# Patient Record
Sex: FEMALE | Race: WHITE | NOT HISPANIC OR LATINO | Employment: OTHER | URBAN - METROPOLITAN AREA
[De-identification: names, ages, dates, MRNs, and addresses within clinical notes are randomized per-mention and may not be internally consistent; named-entity substitution may affect disease eponyms.]

---

## 2017-10-23 ENCOUNTER — TRANSCRIBE ORDERS (OUTPATIENT)
Dept: ADMINISTRATIVE | Facility: HOSPITAL | Age: 72
End: 2017-10-23

## 2017-10-23 DIAGNOSIS — Z12.31 VISIT FOR SCREENING MAMMOGRAM: Primary | ICD-10-CM

## 2017-12-14 ENCOUNTER — HOSPITAL ENCOUNTER (OUTPATIENT)
Dept: MAMMOGRAPHY | Facility: HOSPITAL | Age: 72
Discharge: HOME | End: 2017-12-14
Attending: INTERNAL MEDICINE | Admitting: INTERNAL MEDICINE

## 2017-12-14 DIAGNOSIS — Z12.31 VISIT FOR SCREENING MAMMOGRAM: ICD-10-CM

## 2017-12-14 PROCEDURE — 77063 BREAST TOMOSYNTHESIS BI: CPT

## 2017-12-14 PROCEDURE — G0202 SCR MAMMO BI INCL CAD: HCPCS | Performed by: RADIOLOGY

## 2017-12-14 PROCEDURE — G0202 SCR MAMMO BI INCL CAD: HCPCS

## 2017-12-14 PROCEDURE — 77063 BREAST TOMOSYNTHESIS BI: CPT | Performed by: RADIOLOGY

## 2018-03-04 ENCOUNTER — APPOINTMENT (OUTPATIENT)
Dept: CT IMAGING | Facility: HOSPITAL | Age: 73
End: 2018-03-04

## 2018-03-04 ENCOUNTER — HOSPITAL ENCOUNTER (EMERGENCY)
Facility: HOSPITAL | Age: 73
Discharge: HOME | End: 2018-03-04
Attending: EMERGENCY MEDICINE | Admitting: EMERGENCY MEDICINE

## 2018-03-04 VITALS
SYSTOLIC BLOOD PRESSURE: 134 MMHG | OXYGEN SATURATION: 92 % | RESPIRATION RATE: 18 BRPM | TEMPERATURE: 98.3 F | BODY MASS INDEX: 30.66 KG/M2 | WEIGHT: 184 LBS | HEIGHT: 65 IN | HEART RATE: 77 BPM | DIASTOLIC BLOOD PRESSURE: 66 MMHG

## 2018-03-04 DIAGNOSIS — M54.12 CERVICAL RADICULOPATHY: Primary | ICD-10-CM

## 2018-03-04 DIAGNOSIS — M25.78 OSTEOPHYTE OF CERVICAL SPINE: ICD-10-CM

## 2018-03-04 PROCEDURE — 72125 CT NECK SPINE W/O DYE: CPT

## 2018-03-04 PROCEDURE — 63710000001 PREDNISONE PER 1 MG: Performed by: PHYSICIAN ASSISTANT

## 2018-03-04 PROCEDURE — 99283 EMERGENCY DEPT VISIT LOW MDM: CPT

## 2018-03-04 RX ORDER — ASCORBIC ACID 500 MG
500 TABLET ORAL DAILY
COMMUNITY

## 2018-03-04 RX ORDER — ALPRAZOLAM 0.25 MG/1
0.25 TABLET ORAL 2 TIMES DAILY PRN
COMMUNITY
End: 2021-09-17

## 2018-03-04 RX ORDER — LOSARTAN POTASSIUM 50 MG/1
50 TABLET ORAL DAILY
COMMUNITY
End: 2021-12-09

## 2018-03-04 RX ORDER — HYDROCODONE BITARTRATE AND ACETAMINOPHEN 5; 325 MG/1; MG/1
1 TABLET ORAL EVERY 6 HOURS PRN
Qty: 12 TABLET | Refills: 0 | OUTPATIENT
Start: 2018-03-04 | End: 2021-09-17

## 2018-03-04 RX ORDER — HYDROCODONE BITARTRATE AND ACETAMINOPHEN 10; 325 MG/1; MG/1
1 TABLET ORAL ONCE
Status: COMPLETED | OUTPATIENT
Start: 2018-03-04 | End: 2018-03-04

## 2018-03-04 RX ORDER — PREDNISONE 20 MG/1
60 TABLET ORAL ONCE
Status: COMPLETED | OUTPATIENT
Start: 2018-03-04 | End: 2018-03-04

## 2018-03-04 RX ORDER — CYCLOBENZAPRINE HCL 10 MG
10 TABLET ORAL NIGHTLY PRN
COMMUNITY

## 2018-03-04 RX ORDER — DULOXETIN HYDROCHLORIDE 60 MG/1
60 CAPSULE, DELAYED RELEASE ORAL DAILY
COMMUNITY

## 2018-03-04 RX ORDER — PREDNISONE 20 MG/1
TABLET ORAL
Qty: 18 TABLET | Refills: 0 | OUTPATIENT
Start: 2018-03-04 | End: 2021-09-17

## 2018-03-04 RX ORDER — ONDANSETRON 4 MG/1
4 TABLET, ORALLY DISINTEGRATING ORAL EVERY 8 HOURS PRN
Qty: 12 TABLET | Refills: 0 | OUTPATIENT
Start: 2018-03-04 | End: 2021-09-17

## 2018-03-04 RX ORDER — ASPIRIN 81 MG/1
81 TABLET ORAL DAILY
COMMUNITY

## 2018-03-04 RX ORDER — CHLORAL HYDRATE 500 MG
CAPSULE ORAL 2 TIMES DAILY WITH MEALS
COMMUNITY
End: 2021-09-17

## 2018-03-04 RX ORDER — ONDANSETRON 4 MG/1
4 TABLET, ORALLY DISINTEGRATING ORAL ONCE
Status: COMPLETED | OUTPATIENT
Start: 2018-03-04 | End: 2018-03-04

## 2018-03-04 RX ORDER — ROSUVASTATIN CALCIUM 20 MG/1
20 TABLET, COATED ORAL DAILY
COMMUNITY

## 2018-03-04 RX ORDER — MELATONIN
2000 DAILY
COMMUNITY

## 2018-03-04 RX ADMIN — HYDROCODONE BITARTRATE AND ACETAMINOPHEN 1 TABLET: 10; 325 TABLET ORAL at 10:05

## 2018-03-04 RX ADMIN — PREDNISONE 60 MG: 20 TABLET ORAL at 10:06

## 2018-03-04 RX ADMIN — ONDANSETRON 4 MG: 4 TABLET, ORALLY DISINTEGRATING ORAL at 10:06

## 2018-03-04 NOTE — ED PROVIDER NOTES
Subjective   Patient has had a slow progressive increasing pain in the left side of her neck.  This radiates down into her left arm.  She denies any weakness of the arm.  She's had some tingling sensation associated with this.  She's been using Flexeril which is her regular medication for her daily and this has not really helped at all.  Patient had no recent trauma no recent fall.  Patient reports that she has seen her family doctor in the setting or to physical therapy but she's not been started at this point.  No prior history of any spontaneous fractures.  She has a chest pain shortness of breath diaphoresis nausea or vomiting the pain is definitely reproducible.      Patient is a 73 y.o. female presenting with neck pain.   History provided by:  Patient and spouse   used: No    Neck Pain   Pain location:  L side  Quality:  Burning, aching and shooting  Pain radiates to:  L shoulder and L arm  Pain severity:  Severe  Pain is:  Same all the time  Onset quality:  Gradual  Duration:  2 weeks  Timing:  Constant  Progression:  Waxing and waning  Chronicity:  New  Context: not fall, not MCA and not MVC    Relieved by:  Nothing  Worsened by:  Position, sneezing, coughing, bending and twisting  Ineffective treatments:  Ice and muscle relaxants  Associated symptoms: numbness and tingling    Associated symptoms: no bladder incontinence, no bowel incontinence, no chest pain, no fever, no leg pain, no paresis, no photophobia, no syncope, no visual change, no weakness and no weight loss    Risk factors: no hx of head and neck radiation, no hx of osteoporosis, no hx of spinal trauma, no recent epidural, no recent head injury and no recurrent falls        Review of Systems   Constitutional: Negative for chills, fever and weight loss.   HENT: Negative for rhinorrhea and sore throat.    Eyes: Negative for photophobia.   Respiratory: Negative for chest tightness, shortness of breath and wheezing.     Cardiovascular: Negative for chest pain, palpitations, leg swelling and syncope.   Gastrointestinal: Negative for bowel incontinence.   Genitourinary: Negative for bladder incontinence, dysuria, hematuria and urgency.   Musculoskeletal: Positive for neck pain and neck stiffness. Negative for back pain.   Skin: Negative for pallor.   Neurological: Positive for tingling and numbness. Negative for weakness.   Psychiatric/Behavioral: Negative.    All other systems reviewed and are negative.      Past Medical History:   Diagnosis Date   • Arthritis    • Cancer     skin    • Fibromyalgia    • Hyperlipidemia    • Hypertension        Allergies   Allergen Reactions   • Lisinopril Cough       Past Surgical History:   Procedure Laterality Date   • BREAST BIOPSY Right 2011   • CYST REMOVAL     • DILATATION AND CURETTAGE     • KNEE SURGERY Bilateral    • MOUTH SURGERY         Family History   Problem Relation Age of Onset   • Breast cancer Mother 73   • Ovarian cancer Neg Hx        Social History     Social History   • Marital status:      Social History Main Topics   • Smoking status: Never Smoker   • Smokeless tobacco: Never Used   • Alcohol use No   • Drug use: No   • Sexual activity: Defer     Other Topics Concern   • Not on file           Objective   Physical Exam   Constitutional: She is oriented to person, place, and time. She appears well-developed and well-nourished.   HENT:   Head: Normocephalic and atraumatic.   Right Ear: External ear normal.   Left Ear: External ear normal.   Nose: Nose normal.   Mouth/Throat: Oropharynx is clear and moist.   Eyes: Conjunctivae are normal. No scleral icterus.   Neck: Normal range of motion. No thyromegaly present.   Tender over the lower portion of the C-spine again there is no step-off no crepitus no rash no lesions she point tender also left trapezius area.  The pain is a bit and will be worse with extension of her neck and flexion of her neck.  She has  are 5 over 5  biceps and triceps strength is 5 over 5 as well.  Deep tendon reflexes noted to be plus 2 the brachioradialis and biceps.   Cardiovascular: Normal rate, regular rhythm and normal heart sounds.    Pulmonary/Chest: Effort normal and breath sounds normal. No respiratory distress. She has no wheezes. She has no rales. She exhibits no tenderness.   Musculoskeletal: Normal range of motion.   Lymphadenopathy:     She has no cervical adenopathy.   Neurological: She is alert and oriented to person, place, and time. She has normal reflexes. She displays normal reflexes. No cranial nerve deficit. Coordination normal.   Skin: Skin is warm and dry.   Psychiatric: She has a normal mood and affect. Her behavior is normal. Judgment and thought content normal.   Nursing note and vitals reviewed.      Procedures         ED Course  ED Course   Comment By Time   CT scan shows osteophyte causing mass effect on the thecal sac.  Above this is due to the radicular causing the radiculopathy.  We'll refer her to neurosurgery on call.  ESTEFANÍA Chew 03/04 1140                  MDM  Number of Diagnoses or Management Options  Cervical radiculopathy: new and requires workup  Osteophyte of cervical spine: new and requires workup     Amount and/or Complexity of Data Reviewed  Tests in the radiology section of CPT®: ordered and reviewed  Discuss the patient with other providers: yes    Patient Progress  Patient progress: stable      Final diagnoses:   Cervical radiculopathy   Osteophyte of cervical spine            ESTEFANÍA Hurd  03/12/18 [id number]

## 2018-03-04 NOTE — DISCHARGE INSTRUCTIONS
Cervical Radiculopathy  Cervical radiculopathy happens when a nerve in the neck (cervical nerve) is pinched or bruised. This condition can develop because of an injury or as part of the normal aging process. Pressure on the cervical nerves can cause pain or numbness that runs from the neck all the way down into the arm and fingers. Usually, this condition gets better with rest. Treatment may be needed if the condition does not improve.  What are the causes?  This condition may be caused by:  · Injury.  · Slipped (herniated) disk.  · Muscle tightness in the neck because of overuse.  · Arthritis.  · Breakdown or degeneration in the bones and joints of the spine (spondylosis) due to aging.  · Bone spurs that may develop near the cervical nerves.  What are the signs or symptoms?  Symptoms of this condition include:  · Pain that runs from the neck to the arm and hand. The pain can be severe or irritating. It may be worse when the neck is moved.  · Numbness or weakness in the affected arm and hand.  How is this diagnosed?  This condition may be diagnosed based on symptoms, medical history, and a physical exam. You may also have tests, including:  · X-rays.  · CT scan.  · MRI.  · Electromyogram (EMG).  · Nerve conduction tests.  How is this treated?  In many cases, treatment is not needed for this condition. With rest, the condition usually gets better over time. If treatment is needed, options may include:  · Wearing a soft neck collar for short periods of time.  · Physical therapy to strengthen your neck muscles.  · Medicines, such as NSAIDs, oral corticosteroids, or spinal injections.  · Surgery. This may be needed if other treatments do not help. Various types of surgery may be done depending on the cause of your problems.  Follow these instructions at home:  Managing pain   · Take over-the-counter and prescription medicines only as told by your health care provider.  · If directed, apply ice to the affected  area.  ¨ Put ice in a plastic bag.  ¨ Place a towel between your skin and the bag.  ¨ Leave the ice on for 20 minutes, 2-3 times per day.  · If ice does not help, you can try using heat. Take a warm shower or warm bath, or use a heat pack as told by your health care provider.  · Try a gentle neck and shoulder massage to help relieve symptoms.  Activity   · Rest as needed. Follow instructions from your health care provider about any restrictions on activities.  · Do stretching and strengthening exercises as told by your health care provider or physical therapist.  General instructions   · If you were given a soft collar, wear it as told by your health care provider.  · Use a flat pillow when you sleep.  · Keep all follow-up visits as told by your health care provider. This is important.  Contact a health care provider if:  · Your condition does not improve with treatment.  Get help right away if:  · Your pain gets much worse and cannot be controlled with medicines.  · You have weakness or numbness in your hand, arm, face, or leg.  · You have a high fever.  · You have a stiff, rigid neck.  · You lose control of your bowels or your bladder (have incontinence).  · You have trouble with walking, balance, or speaking.  This information is not intended to replace advice given to you by your health care provider. Make sure you discuss any questions you have with your health care provider.  Document Released: 09/12/2002 Document Revised: 05/25/2017 Document Reviewed: 02/11/2016  [employer] Patient Education © 2017 [employer]

## 2018-10-17 ENCOUNTER — HOSPITAL ENCOUNTER (OUTPATIENT)
Dept: GENERAL RADIOLOGY | Facility: HOSPITAL | Age: 73
Discharge: HOME | End: 2018-10-17
Attending: INTERNAL MEDICINE | Admitting: INTERNAL MEDICINE

## 2018-10-17 ENCOUNTER — TRANSCRIBE ORDERS (OUTPATIENT)
Dept: ADMINISTRATIVE | Facility: HOSPITAL | Age: 73
End: 2018-10-17

## 2018-10-17 DIAGNOSIS — R10.32 LEFT GROIN PAIN: Primary | ICD-10-CM

## 2018-10-17 PROCEDURE — 73502 X-RAY EXAM HIP UNI 2-3 VIEWS: CPT

## 2018-11-12 ENCOUNTER — TRANSCRIBE ORDERS (OUTPATIENT)
Dept: ADMINISTRATIVE | Facility: HOSPITAL | Age: 73
End: 2018-11-12

## 2018-11-12 DIAGNOSIS — Z12.31 VISIT FOR SCREENING MAMMOGRAM: Primary | ICD-10-CM

## 2019-01-08 RX ORDER — SODIUM, POTASSIUM,MAG SULFATES 17.5-3.13G
SOLUTION, RECONSTITUTED, ORAL ORAL
Qty: 2 BOTTLE | Refills: 0 | OUTPATIENT
Start: 2019-01-08 | End: 2021-09-17

## 2019-01-15 ENCOUNTER — OUTSIDE FACILITY SERVICE (OUTPATIENT)
Dept: GASTROENTEROLOGY | Facility: CLINIC | Age: 74
End: 2019-01-15

## 2019-01-15 ENCOUNTER — LAB REQUISITION (OUTPATIENT)
Dept: LAB | Facility: HOSPITAL | Age: 74
End: 2019-01-15

## 2019-01-15 DIAGNOSIS — Z86.010 HISTORY OF COLONIC POLYPS: ICD-10-CM

## 2019-01-15 PROCEDURE — 45385 COLONOSCOPY W/LESION REMOVAL: CPT | Performed by: INTERNAL MEDICINE

## 2019-01-15 PROCEDURE — 88305 TISSUE EXAM BY PATHOLOGIST: CPT | Performed by: INTERNAL MEDICINE

## 2019-01-17 LAB
CYTO UR: NORMAL
LAB AP CASE REPORT: NORMAL
LAB AP CLINICAL INFORMATION: NORMAL
PATH REPORT.FINAL DX SPEC: NORMAL
PATH REPORT.GROSS SPEC: NORMAL

## 2019-01-18 ENCOUNTER — TELEPHONE (OUTPATIENT)
Dept: GASTROENTEROLOGY | Facility: CLINIC | Age: 74
End: 2019-01-18

## 2019-01-18 NOTE — TELEPHONE ENCOUNTER
I called and left a message with Mrs. Robin.  She did have one adenoma polyp and will need a repeat exam in 5 years.

## 2019-01-25 ENCOUNTER — HOSPITAL ENCOUNTER (OUTPATIENT)
Dept: MAMMOGRAPHY | Facility: HOSPITAL | Age: 74
Discharge: HOME | End: 2019-01-25
Attending: INTERNAL MEDICINE | Admitting: INTERNAL MEDICINE

## 2019-01-25 DIAGNOSIS — Z12.31 VISIT FOR SCREENING MAMMOGRAM: ICD-10-CM

## 2019-01-25 PROCEDURE — 77067 SCR MAMMO BI INCL CAD: CPT

## 2019-01-25 PROCEDURE — 77067 SCR MAMMO BI INCL CAD: CPT | Performed by: RADIOLOGY

## 2019-01-25 PROCEDURE — 77063 BREAST TOMOSYNTHESIS BI: CPT

## 2019-01-25 PROCEDURE — 77063 BREAST TOMOSYNTHESIS BI: CPT | Performed by: RADIOLOGY

## 2019-03-19 ENCOUNTER — OFFICE VISIT (OUTPATIENT)
Dept: ORTHOPEDIC SURGERY | Facility: CLINIC | Age: 74
End: 2019-03-19

## 2019-03-19 VITALS — HEART RATE: 115 BPM | BODY MASS INDEX: 30.12 KG/M2 | OXYGEN SATURATION: 96 % | HEIGHT: 65 IN | WEIGHT: 180.78 LBS

## 2019-03-19 DIAGNOSIS — Z96.653 HISTORY OF TOTAL BILATERAL KNEE REPLACEMENT: Primary | ICD-10-CM

## 2019-03-19 PROCEDURE — 99213 OFFICE O/P EST LOW 20 MIN: CPT | Performed by: PHYSICIAN ASSISTANT

## 2019-03-19 NOTE — PROGRESS NOTES
[facility] – [place] [facility] Note    Subjective     Patient: Jose Robin  : 1945    Primary Care Provider: Woodrow Peterson MD    Requesting Provider: As above    Follow-up of the Left Knee and Follow-up of the Right Knee      History    Chief Complaint: bilateral TKA    History of Present Illness: Patient returns today for routine annual follow-up bilateral total knee arthroplasty with Dr. Valentino in .  She reports no pain or problems with her knees.  She is been very happy with her outcome.  No new symptoms.     Current Outpatient Medications on File Prior to Visit   Medication Sig Dispense Refill   • ALPRAZolam (XANAX) 0.25 MG tablet Take 0.25 mg by mouth 2 (Two) Times a Day As Needed for Anxiety.     • aspirin 81 MG EC tablet Take 81 mg by mouth Daily.     • cholecalciferol (VITAMIN D3) 1000 units tablet Take 2,000 Units by mouth Daily.     • cyclobenzaprine (FLEXERIL) 10 MG tablet Take 10 mg by mouth At Night As Needed for Muscle Spasms.     • DULoxetine (CYMBALTA) 60 MG capsule Take 60 mg by mouth Daily.     • HYDROcodone-acetaminophen (NORCO) 5-325 MG per tablet Take 1 tablet by mouth Every 6 (Six) Hours As Needed for Severe Pain . 12 tablet 0   • losartan (COZAAR) 50 MG tablet Take 50 mg by mouth Daily.     • Multiple Vitamins-Minerals (CENTRUM SILVER PO) Take 1 tablet by mouth Daily.     • Omega-3 Fatty Acids (FISH OIL) 1000 MG capsule capsule Take  by mouth 2 (Two) Times a Day With Meals.     • ondansetron ODT (ZOFRAN-ODT) 4 MG disintegrating tablet Take 1 tablet by mouth Every 8 (Eight) Hours As Needed for Nausea. 12 tablet 0   • predniSONE (DELTASONE) 20 MG tablet 60 mg by mouth daily for 3 days then 40 mg by mouth daily for 3 days then 20 mg by mouth daily for 3 days 18 tablet 0   • rosuvastatin (CRESTOR) 20 MG tablet Take 20 mg by mouth Daily.     • sodium-potassium-magnesium sulfates (SUPREP BOWEL PREP KIT) 17.5-3.13-1.6 GM/177ML solution oral solution Dispense 1  kit. Follow instructions that were mailed to your home. If you didn't receive these call [phone]. 2 bottle 0   • vitamin C (ASCORBIC ACID) 500 MG tablet Take 500 mg by mouth Daily.       No current facility-administered medications on file prior to visit.       Allergies   Allergen Reactions   • Atorvastatin Myalgia   • Bupropion Other (See Comments)   • Lisinopril Cough      Past Medical History:   Diagnosis Date   • Arthritis    • Breast injury     bruised left breast from a fall.   • Cancer (CMS/HCC)     skin    • Fibromyalgia    • Hyperlipidemia    • Hypertension      Past Surgical History:   Procedure Laterality Date   • BREAST BIOPSY Right 2011   • CYST REMOVAL     • DILATATION AND CURETTAGE     • KNEE SURGERY Bilateral    • MOUTH SURGERY       Family History   Problem Relation Age of Onset   • Breast cancer Mother 73   • Ovarian cancer Other         age unknown      Social History     Socioeconomic History   • Marital status:      Spouse name: Not on file   • Number of children: Not on file   • Years of education: Not on file   • Highest education level: Not on file   Social Needs   • Financial resource strain: Not on file   • Food insecurity - worry: Not on file   • Food insecurity - inability: Not on file   • Transportation needs - medical: Not on file   • Transportation needs - non-medical: Not on file   Occupational History   • Not on file   Tobacco Use   • Smoking status: Never Smoker   • Smokeless tobacco: Never Used   Substance and Sexual Activity   • Alcohol use: No   • Drug use: No   • Sexual activity: Defer   Other Topics Concern   • Not on file   Social History Narrative   • Not on file        Review of Systems   Constitutional: Negative.    HENT: Positive for postnasal drip.    Eyes: Negative.    Respiratory: Negative.    Cardiovascular: Negative.    Gastrointestinal: Positive for constipation.   Endocrine: Negative.    Genitourinary: Negative.    Musculoskeletal: Positive for  "arthralgias and myalgias.   Skin: Negative.    Allergic/Immunologic: Negative.    Neurological: Negative.    Hematological: Negative.    Psychiatric/Behavioral: Negative.        The following portions of the patient's history were reviewed and updated as appropriate: allergies, current medications, past family history, past medical history, past social history, past surgical history and problem list.      Objective      Physical Exam  Pulse 115   Ht 165.1 cm (65\")   Wt 82 kg (180 lb 12.4 oz)   SpO2 96%   BMI 30.08 kg/m²     Body mass index is 30.08 kg/m².    GENERAL: Body habitus: obese    Lower extremity edema: Left: trace; Right: trace    Varicose veins:  Left: mild; Right: mild    Gait: normal     Mental Status:  awake and alert; oriented to person, place, and time    Voice:  clear  SKIN:  Normal    Hair Growth:  Right:normal; Left:  normal  HEENT: Head: Normocephalic, atraumatic,  without obvious abnormality.  eye: normal external eye, no icterus   PULM:  Repiratory effort normal    Ortho Exam    Right Knee Exam  ----------  ALIGNMENT: Right: neutral----------  RANGE OF MOTION:  Right: Normal (0-120 degrees) with no extensor lag or flexion contracture  LIGAMENTOUS STABILITY:   Right:stable to varus and valgus stress at terminal extension and 30 degrees without any evidence of laxity----------  STRENGTH:  KNEE FLEXION Right 5/5  KNEE EXTENSION Right 5/5 ----------  PAIN WITH PALPATION: Right denies tenderness to palpation about the knee  PAIN WITH KNEE ROM: Right no  PATELLAR CREPITUS: Right no   ----------    Left Knee Exam  ----------  Knee Exam:  ----------  ALIGNMENT:  Left: neutral  ----------  RANGE OF MOTION:  Left: Normal (0-120 degrees) with no extensor lag or flexion contracture  LIGAMENTOUS STABILITY:   Left:stable to varus and valgus stress at terminal extension and 30 degrees without any evidence of laxity   ----------  STRENGTH:  KNEE FLEXION  Left 5/5  KNEE EXTENSION Left 5/5  ----------  PAIN " WITH PALPATION: Left denies tenderness to palpation about the knee  PAIN WITH KNEE ROM:  Left no  PATELLAR CREPITUS:  Left no  ----------    Well healed anterior incision    Medical Decision Making    Data Review:   ordered and reviewed x-rays today    Assessment:  1. History of total bilateral knee replacement        Plan:  Doing well status post bilateral total knee arthroplasty with Dr. Valentino.  X-rays show well-positioned, cemented total knee arthroplasty with no evidence of osteolysis, subsidence or fracture.  Patient is very been very pleased with her outcome.  She is now 5 years out of surgery and we no longer need to see her on an annual basis.  She will return in 5 years with repeat x-rays or sooner if needed.      Dot Michael  03/19/19  10:30 AM

## 2020-01-06 ENCOUNTER — TRANSCRIBE ORDERS (OUTPATIENT)
Dept: ADMINISTRATIVE | Facility: HOSPITAL | Age: 75
End: 2020-01-06

## 2020-01-06 DIAGNOSIS — Z12.31 VISIT FOR SCREENING MAMMOGRAM: Primary | ICD-10-CM

## 2020-04-14 ENCOUNTER — APPOINTMENT (OUTPATIENT)
Dept: MAMMOGRAPHY | Facility: HOSPITAL | Age: 75
End: 2020-04-14

## 2020-06-09 ENCOUNTER — HOSPITAL ENCOUNTER (OUTPATIENT)
Dept: MAMMOGRAPHY | Facility: HOSPITAL | Age: 75
Discharge: HOME | End: 2020-06-09
Admitting: INTERNAL MEDICINE

## 2020-06-09 DIAGNOSIS — Z12.31 VISIT FOR SCREENING MAMMOGRAM: ICD-10-CM

## 2020-06-09 PROCEDURE — 77063 BREAST TOMOSYNTHESIS BI: CPT | Performed by: RADIOLOGY

## 2020-06-09 PROCEDURE — 77063 BREAST TOMOSYNTHESIS BI: CPT

## 2020-06-09 PROCEDURE — 77067 SCR MAMMO BI INCL CAD: CPT

## 2020-06-09 PROCEDURE — 77067 SCR MAMMO BI INCL CAD: CPT | Performed by: RADIOLOGY

## 2021-04-15 ENCOUNTER — TRANSCRIBE ORDERS (OUTPATIENT)
Dept: GENERAL RADIOLOGY | Facility: HOSPITAL | Age: 76
End: 2021-04-15

## 2021-04-15 ENCOUNTER — HOSPITAL ENCOUNTER (OUTPATIENT)
Dept: GENERAL RADIOLOGY | Facility: HOSPITAL | Age: 76
Discharge: HOME | End: 2021-04-15
Admitting: INTERNAL MEDICINE

## 2021-04-15 DIAGNOSIS — M25.511 RIGHT SHOULDER PAIN, UNSPECIFIED CHRONICITY: Primary | ICD-10-CM

## 2021-04-15 DIAGNOSIS — M25.511 RIGHT SHOULDER PAIN, UNSPECIFIED CHRONICITY: ICD-10-CM

## 2021-04-15 PROCEDURE — 73030 X-RAY EXAM OF SHOULDER: CPT

## 2021-04-16 ENCOUNTER — TRANSCRIBE ORDERS (OUTPATIENT)
Dept: ADMINISTRATIVE | Facility: HOSPITAL | Age: 76
End: 2021-04-16

## 2021-04-16 DIAGNOSIS — M25.519 SHOULDER PAIN, UNSPECIFIED CHRONICITY, UNSPECIFIED LATERALITY: Primary | ICD-10-CM

## 2021-05-06 ENCOUNTER — HOSPITAL ENCOUNTER (OUTPATIENT)
Dept: MRI IMAGING | Facility: HOSPITAL | Age: 76
Discharge: HOME | End: 2021-05-06
Admitting: INTERNAL MEDICINE

## 2021-05-06 DIAGNOSIS — M25.519 SHOULDER PAIN, UNSPECIFIED CHRONICITY, UNSPECIFIED LATERALITY: ICD-10-CM

## 2021-05-06 PROCEDURE — 73221 MRI JOINT UPR EXTREM W/O DYE: CPT

## 2021-05-17 ENCOUNTER — TRANSCRIBE ORDERS (OUTPATIENT)
Dept: ADMINISTRATIVE | Facility: HOSPITAL | Age: 76
End: 2021-05-17

## 2021-05-17 DIAGNOSIS — Z12.31 VISIT FOR SCREENING MAMMOGRAM: Primary | ICD-10-CM

## 2021-06-18 ENCOUNTER — HOSPITAL ENCOUNTER (OUTPATIENT)
Dept: MAMMOGRAPHY | Facility: HOSPITAL | Age: 76
Discharge: HOME | End: 2021-06-18
Admitting: INTERNAL MEDICINE

## 2021-06-18 DIAGNOSIS — Z12.31 VISIT FOR SCREENING MAMMOGRAM: ICD-10-CM

## 2021-06-18 PROCEDURE — 77063 BREAST TOMOSYNTHESIS BI: CPT | Performed by: RADIOLOGY

## 2021-06-18 PROCEDURE — 77063 BREAST TOMOSYNTHESIS BI: CPT

## 2021-06-18 PROCEDURE — 77067 SCR MAMMO BI INCL CAD: CPT | Performed by: RADIOLOGY

## 2021-06-18 PROCEDURE — 77067 SCR MAMMO BI INCL CAD: CPT

## 2021-09-17 ENCOUNTER — APPOINTMENT (OUTPATIENT)
Dept: GENERAL RADIOLOGY | Facility: HOSPITAL | Age: 76
End: 2021-09-17

## 2021-09-17 ENCOUNTER — APPOINTMENT (OUTPATIENT)
Dept: CT IMAGING | Facility: HOSPITAL | Age: 76
End: 2021-09-17

## 2021-09-17 ENCOUNTER — HOSPITAL ENCOUNTER (EMERGENCY)
Facility: HOSPITAL | Age: 76
Discharge: HOME | End: 2021-09-17
Attending: EMERGENCY MEDICINE | Admitting: EMERGENCY MEDICINE

## 2021-09-17 VITALS
DIASTOLIC BLOOD PRESSURE: 66 MMHG | OXYGEN SATURATION: 95 % | BODY MASS INDEX: 25.16 KG/M2 | RESPIRATION RATE: 17 BRPM | SYSTOLIC BLOOD PRESSURE: 133 MMHG | TEMPERATURE: 97.1 F | HEIGHT: 65 IN | HEART RATE: 99 BPM | WEIGHT: 151 LBS

## 2021-09-17 DIAGNOSIS — S62.101A CLOSED FRACTURE OF RIGHT WRIST, INITIAL ENCOUNTER: ICD-10-CM

## 2021-09-17 DIAGNOSIS — S09.90XA CLOSED HEAD INJURY, INITIAL ENCOUNTER: ICD-10-CM

## 2021-09-17 DIAGNOSIS — S01.01XA SCALP LACERATION, INITIAL ENCOUNTER: ICD-10-CM
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
## 2021-09-17 PROCEDURE — 99282 EMERGENCY DEPT VISIT SF MDM: CPT

## 2021-09-17 PROCEDURE — 25010000002 TDAP 5-2.5-18.5 LF-MCG/0.5 SUSPENSION: Performed by: EMERGENCY MEDICINE

## 2021-09-17 PROCEDURE — 90471 IMMUNIZATION ADMIN: CPT | Performed by: EMERGENCY MEDICINE

## 2021-09-17 PROCEDURE — 73110 X-RAY EXAM OF WRIST: CPT

## 2021-09-17 PROCEDURE — 90715 TDAP VACCINE 7 YRS/> IM: CPT | Performed by: EMERGENCY MEDICINE

## 2021-09-17 PROCEDURE — 70450 CT HEAD/BRAIN W/O DYE: CPT

## 2021-09-17 RX ORDER — HYDROCODONE BITARTRATE AND ACETAMINOPHEN 5; 325 MG/1; MG/1
1 TABLET ORAL EVERY 6 HOURS PRN
Qty: 12 TABLET | Refills: 0 | Status: SHIPPED | OUTPATIENT
Start: 2021-09-17 | End: 2021-10-28

## 2021-09-17 RX ADMIN — TETANUS TOXOID, REDUCED DIPHTHERIA TOXOID AND ACELLULAR PERTUSSIS VACCINE, ADSORBED 0.5 ML: 5; 2.5; 8; 8; 2.5 SUSPENSION INTRAMUSCULAR at 16:10

## 2021-09-17 NOTE — ED PROVIDER NOTES
Subjective   Ms. Robin is a 76-year-old female that was at [employer] states her foot stuck to the ground she fell back hit her head and hurt her right wrist.  She reports she did not lose consciousness.  She does have a headache.  She has a laceration on the top of her scalp which is been bleeding.  She does take a baby aspirin a day but no other anticoagulants.  She reports that she has had pain in the right wrist mostly on the thumb side.  She reports she is able to move all fingers has no numbness or tingling.  She is right-hand dominant.  No neck pain associated with this.  She has no other complaints.      History provided by:  Patient   used: No    Head Laceration  Location:  Left parietal  Quality:  Burning  Severity:  Moderate  Onset quality:  Sudden  Timing:  Constant  Progression:  Unchanged  Chronicity:  New  Context:  Foot stuck to ground during [employer] class fell backwards hurt her wrist and lacerated her scalp with a head injury.  Relieved by:  Nothing  Worsened by:  Nothing  Associated symptoms: headaches    Associated symptoms: no chest pain, no congestion, no cough, no diarrhea, no ear pain, no fever, no myalgias, no nausea, no rhinorrhea, no shortness of breath, no sore throat and no wheezing        Review of Systems   Constitutional: Negative for chills and fever.   HENT: Negative for congestion, ear pain, rhinorrhea and sore throat.    Respiratory: Negative for cough, chest tightness, shortness of breath and wheezing.    Cardiovascular: Negative for chest pain and palpitations.   Gastrointestinal: Negative for diarrhea and nausea.   Musculoskeletal: Negative for myalgias.   Neurological: Positive for headaches.   Hematological: Negative for adenopathy.   Psychiatric/Behavioral: Negative.    All other systems reviewed and are negative.      Past Medical History:   Diagnosis Date   • Arthritis    • Breast injury     bruised left breast from a fall.   • Cancer (CMS/HCC)      skin    • Fibromyalgia    • Hyperlipidemia    • Hypertension        Allergies   Allergen Reactions   • Atorvastatin Myalgia   • Bupropion Other (See Comments)   • Lisinopril Cough       Past Surgical History:   Procedure Laterality Date   • BREAST BIOPSY Right 2011   • CYST REMOVAL     • DILATATION AND CURETTAGE     • KNEE SURGERY Bilateral    • MOUTH SURGERY         Family History   Problem Relation Age of Onset   • Breast cancer Mother 73   • Ovarian cancer Other         age unknown       Social History     Socioeconomic History   • Marital status:      Spouse name: Not on file   • Number of children: Not on file   • Years of education: Not on file   • Highest education level: Not on file   Tobacco Use   • Smoking status: Never Smoker   • Smokeless tobacco: Never Used   Substance and Sexual Activity   • Alcohol use: No   • Drug use: No   • Sexual activity: Defer           Objective   Physical Exam  Vitals and nursing note reviewed.   Constitutional:       Appearance: She is well-developed.   HENT:      Head: Normocephalic.      Comments: Scalp is a 1.5 cm laceration successfully deep oozing bleeding.     Right Ear: External ear normal.      Left Ear: External ear normal.      Nose: Nose normal.   Eyes:      General: No scleral icterus.     Conjunctiva/sclera: Conjunctivae normal.      Pupils: Pupils are equal, round, and reactive to light.   Neck:      Thyroid: No thyromegaly.   Cardiovascular:      Rate and Rhythm: Normal rate and regular rhythm.      Heart sounds: Normal heart sounds.   Pulmonary:      Effort: Pulmonary effort is normal. No respiratory distress.      Breath sounds: Normal breath sounds. No wheezing or rales.   Chest:      Chest wall: No tenderness.   Abdominal:      General: Bowel sounds are normal. There is no distension.      Palpations: Abdomen is soft.      Tenderness: There is no abdominal tenderness.   Musculoskeletal:         General: Normal range of motion.      Cervical back:  Normal range of motion.      Comments: Right wrist has a hematoma over the distal radius.  There is tenderness there.  She also some mild tenderness of the snuffbox.  Cap refill is 2 seconds sensation was intact.  No tenderness of the elbow or the shoulder.   Lymphadenopathy:      Cervical: No cervical adenopathy.   Skin:     General: Skin is warm and dry.   Neurological:      Mental Status: She is alert and oriented to person, place, and time.      Cranial Nerves: No cranial nerve deficit.      Coordination: Coordination normal.      Deep Tendon Reflexes: Reflexes are normal and symmetric. Reflexes normal.   Psychiatric:         Behavior: Behavior normal.         Thought Content: Thought content normal.         Judgment: Judgment normal.         Laceration Repair    Date/Time: 9/17/2021 4:59 PM  Performed by: Woodrow Weinberg PA  Authorized by: Lee Almeida MD     Consent:     Consent obtained:  Verbal    Consent given by:  Patient    Risks discussed:  Infection and pain    Alternatives discussed:  No treatment  Anesthesia (see MAR for exact dosages):     Anesthesia method:  None  Laceration details:     Location:  Scalp    Scalp location:  L parietal    Length (cm):  1.5    Depth (mm):  0.5  Repair type:     Repair type:  Simple  Pre-procedure details:     Preparation:  Patient was prepped and draped in usual sterile fashion and imaging obtained to evaluate for foreign bodies  Exploration:     Hemostasis achieved with:  Direct pressure    Wound exploration: wound explored through full range of motion and entire depth of wound probed and visualized      Contaminated: no    Treatment:     Wound cleansed with: Peroxide and saline.    Amount of cleaning:  Standard    Irrigation solution:  Sterile saline  Skin repair:     Repair method:  Staples    Number of staples:  2  Approximation:     Approximation:  Close  Post-procedure details:     Dressing:  Open (no dressing)    Patient tolerance of procedure:   Tolerated well, no immediate complications  Splint - Cast - Strapping    Date/Time: 9/17/2021 5:01 PM  Performed by: Woodrow Weinberg PA  Authorized by: Lee Almeida MD     Consent:     Consent obtained:  Verbal    Consent given by:  Patient    Risks discussed:  Discoloration, numbness, pain and swelling    Alternatives discussed:  No treatment, observation, alternative treatment and referral  Pre-procedure details:     Sensation:  Normal    Skin color:  Warm pink dry  Procedure details:     Laterality:  Right    Location:  Arm    Arm:  R lower arm    Strapping: no      Cast type:  Short arm    Splint type:  Volar short arm    Supplies:  Ortho-Glass  Post-procedure details:     Pain:  Improved    Sensation:  Normal    Skin color:  Warm pink dry    Patient tolerance of procedure:  Tolerated well, no immediate complications               ED Course                                 No results found for this or any previous visit (from the past 24 hour(s)).  Note: In addition to lab results from this visit, the labs listed above may include labs taken at another facility or during a different encounter within the last 24 hours. Please correlate lab times with ED admission and discharge times for further clarification of the services performed during this visit.    CT Head Without Contrast   Final Result   Left parietal scalp laceration. No acute intracranial   abnormality otherwise.           This report was finalized on 9/17/2021 4:44 PM by Ugo Galloway.          XR Wrist 3+ View Right   Final Result   Nondisplaced transverse distal radius fracture. Associated,   mildly displaced dorsal cortical avulsion of the radius.        DICTATED:   09/17/2021   EDITED/ls :   09/17/2021       This report was finalized on 9/17/2021 10:44 PM by Dr. Woo Lujan MD.            Vitals:    09/17/21 1309   BP: 133/66   BP Location: Left arm   Patient Position: Sitting   Pulse: 99   Resp: 17   Temp: 97.1 °F (36.2 °C)   TempSrc:  "Oral   SpO2: 95%   Weight: 68.5 kg (151 lb)   Height: 165.1 cm (65\")     Medications   Tdap (BOOSTRIX) injection 0.5 mL (0.5 mL Intramuscular Given 9/17/21 1610)     ECG/EMG Results (last 24 hours)     ** No results found for the last 24 hours. **        No orders to display                 MDM  Number of Diagnoses or Management Options  Closed fracture of right wrist, initial encounter: new and requires workup  Closed head injury, initial encounter: new and requires workup  Fall, initial encounter: new and requires workup  Scalp laceration, initial encounter: new and requires workup     Amount and/or Complexity of Data Reviewed  Tests in the radiology section of CPT®: ordered and reviewed  Discuss the patient with other providers: yes    Patient Progress  Patient progress: stable      Final diagnoses:   Fall, initial encounter   Closed fracture of right wrist, initial encounter   Closed head injury, initial encounter   Scalp laceration, initial encounter       ED Disposition  ED Disposition     ED Disposition Condition Comment    Discharge Stable           Matti Bell MD  [address] [address]  [phone]      Call for appointment    [facility]  [address] [address] [phone]  In 5 days  For staple removal         Medication List      Stop    ALPRAZolam 0.25 MG tablet  Commonly known as: XANAX     fish oil 1000 MG capsule capsule     ondansetron ODT 4 MG disintegrating tablet  Commonly known as: ZOFRAN-ODT     predniSONE 20 MG tablet  Commonly known as: DELTASONE     sodium-potassium-magnesium sulfates 17.5-3.13-1.6 GM/177ML solution oral solution  Commonly known as: Suprep Bowel Prep Kit           Where to Get Your Medications      These medications were sent to [facility] [address] - [address] AT [facility] - [phone]  - [phone]   [address], " [facility] [address]    Phone: [phone]   · HYDROcodone-acetaminophen 5-325 MG per tablet          Woodrow Weinberg PA  09/18/21 7912

## 2021-09-22 ENCOUNTER — HOSPITAL ENCOUNTER (EMERGENCY)
Facility: HOSPITAL | Age: 76
Discharge: HOME | End: 2021-09-22

## 2021-09-22 VITALS
HEART RATE: 102 BPM | RESPIRATION RATE: 16 BRPM | HEIGHT: 65 IN | OXYGEN SATURATION: 95 % | WEIGHT: 151 LBS | DIASTOLIC BLOOD PRESSURE: 66 MMHG | BODY MASS INDEX: 25.16 KG/M2 | TEMPERATURE: 97.1 F | SYSTOLIC BLOOD PRESSURE: 123 MMHG

## 2021-09-22 PROCEDURE — 99202 OFFICE O/P NEW SF 15 MIN: CPT

## 2021-09-23 ENCOUNTER — OFFICE VISIT (OUTPATIENT)
Dept: ORTHOPEDIC SURGERY | Facility: CLINIC | Age: 76
End: 2021-09-23

## 2021-09-23 VITALS
DIASTOLIC BLOOD PRESSURE: 60 MMHG | HEIGHT: 65 IN | BODY MASS INDEX: 25.16 KG/M2 | SYSTOLIC BLOOD PRESSURE: 147 MMHG | WEIGHT: 151.01 LBS | HEART RATE: 106 BPM

## 2021-09-23 DIAGNOSIS — S52.571A OTHER CLOSED INTRA-ARTICULAR FRACTURE OF DISTAL END OF RIGHT RADIUS, INITIAL ENCOUNTER: Primary | ICD-10-CM

## 2021-09-23 PROCEDURE — 99214 OFFICE O/P EST MOD 30 MIN: CPT | Performed by: ORTHOPAEDIC SURGERY

## 2021-09-23 PROCEDURE — 29075 APPL CST ELBW FNGR SHORT ARM: CPT | Performed by: ORTHOPAEDIC SURGERY

## 2021-09-23 NOTE — PROGRESS NOTES
[facility] Note        Subjective     Pain of the Right Wrist      HPI    Jose Robin is a 76 y.o. female who presents with new problem of: right wrist fracture.  Onset: mechanical fall. The issue has been ongoing for 6 day(s). Pain is a 1/10 on the pain scale. Pain is described as aching. Associated symptoms include pain and swelling. pain medication and/or NSAID and elevating the extremity improve the pain. Previous treatments have included: bracing and NSAIDS.    I have reviewed the following portions of the patient's history:History of Present Illness and review of systems.       Patient is here today for new problem today regarding her right wrist.  6 days ago, she was in a [employer] class and her right foot got caught up and she tripped and fell hitting her head and right wrist.  She is right-hand dominant.  She went to the ER.  She is here for further evaluation and treatment.  She is here with her .    Past Medical History:   Diagnosis Date   • Arthritis    • Breast injury     bruised left breast from a fall.   • Cancer (CMS/HCC)     skin    • Fibromyalgia    • Hyperlipidemia    • Hypertension       Past Surgical History:   Procedure Laterality Date   • BREAST BIOPSY Right 2011   • CYST REMOVAL     • DILATATION AND CURETTAGE     • KNEE SURGERY Bilateral    • MOUTH SURGERY        Family History   Problem Relation Age of Onset   • Breast cancer Mother 73   • Ovarian cancer Other         age unknown     Social History     Socioeconomic History   • Marital status:      Spouse name: Not on file   • Number of children: Not on file   • Years of education: Not on file   • Highest education level: Not on file   Tobacco Use   • Smoking status: Never Smoker   • Smokeless tobacco: Never Used   Substance and Sexual Activity   • Alcohol use: No   • Drug use: No   • Sexual activity: Defer      Current Outpatient Medications on File Prior to Visit   Medication Sig Dispense Refill  "  • aspirin 81 MG EC tablet Take 81 mg by mouth Daily.     • cholecalciferol (VITAMIN D3) 1000 units tablet Take 2,000 Units by mouth Daily.     • cyclobenzaprine (FLEXERIL) 10 MG tablet Take 10 mg by mouth At Night As Needed for Muscle Spasms.     • DULoxetine (CYMBALTA) 60 MG capsule Take 60 mg by mouth Daily.     • HYDROcodone-acetaminophen (NORCO) 5-325 MG per tablet Take 1 tablet by mouth Every 6 (Six) Hours As Needed for Severe Pain . 12 tablet 0   • losartan (COZAAR) 50 MG tablet Take 50 mg by mouth Daily.     • Multiple Vitamins-Minerals (CENTRUM SILVER PO) Take 1 tablet by mouth Daily.     • Probiotic Product (Probiotic-10) chewable tablet Chew.     • rosuvastatin (CRESTOR) 20 MG tablet Take 20 mg by mouth Daily.     • vitamin C (ASCORBIC ACID) 500 MG tablet Take 500 mg by mouth Daily.       No current facility-administered medications on file prior to visit.      Allergies   Allergen Reactions   • Atorvastatin Myalgia   • Bupropion Other (See Comments)   • Lisinopril Cough          Review of Systems   Constitutional: Negative.    HENT: Negative.    Eyes: Negative.    Respiratory: Negative.    Cardiovascular: Negative.    Gastrointestinal: Negative.    Endocrine: Negative.    Genitourinary: Negative.    Musculoskeletal: Positive for arthralgias.   Skin: Negative.    Allergic/Immunologic: Negative.    Neurological: Negative.    Hematological: Negative.    Psychiatric/Behavioral: Negative.         I reviewed the patient's chief complaint, history of present illness, review of systems, past medical history, surgical history, family history, social history, medications and allergy list.        Objective      Physical Exam  /60   Pulse 106   Ht 165.1 cm (65\")   Wt 68.5 kg (151 lb 0.2 oz)   BMI 25.13 kg/m²     Body mass index is 25.13 kg/m².    General  Mental Status - alert  General Appearance - cooperative, well groomed, not in acute distress  Orientation - Oriented X3  Build & Nutrition - well " developed and well nourished  Posture - normal posture  Gait - normal gait       Ortho Exam  Peripheral Vascular   Right Upper Extremity    No cyanotic nail beds    Pink nail beds and rapid capillary refill   Palpation    Radial Pulse - Bilaterally normal    Musculoskeletal   Right Upper Extremity   Radius:    Inspection and Palpation:    Tenderness - Moderately tender and about the wrist    Swelling - present    Effusion - none    Muscle tone - no atrophy    Pulses - +2   Deformities/Malalignments/Discrepancies    Normal bony contour    There is a documented closed fracture : location - right - distal end             Imaging/Studies  Imaging Results (Last 24 Hours)     Procedure Component Value Units Date/Time    XR Wrist 3+ View Right [[id number]] Resulted: 09/23/21 1030     Updated: 09/23/21 1031    Narrative:      Right Wrist X-Ray    Indication: Pain    Views:  AP, Lateral, and Oblique     Comparison: Right wrist 9/17/2021    Findings:  Compared to the prior study, the fracture of the distal radius appears to   be well aligned overall.  There is a intra-articular fracture seen at the   lunate facet.  No bony lesion  Normal soft tissues  Normal joint spaces    Impression:   Intra-articular nondisplaced fracture right distal radius        We reviewed report and x-ray from the ER from 9/17/2020 one of the patient's right wrist as well.  There is intra-articular comminution and a fairly significant dorsal fragment noted on the old x-ray that is not appreciated on today's film.    Assessment    Assessment:  1. Other closed intra-articular fracture of distal end of right radius, initial encounter        Plan:  Continue over-the-counter medication as needed for discomfort  Nondisplaced intra-articular fracture right distal radius--at this point, patient can be treated nonoperatively I believe.  We talked about treatment options and I would like to put her in a fiberglass short arm cast for 2 weeks and then see her back  and remove the cast and get 3 more views of the wrist out of cast and if everything continues to look fine, transition her to a removable brace or Exos and work on hand exercises.        Luís Love MD  09/23/21  10:36 EDT    Dragon disclaimer:  Much of this encounter note is an electronic transcription/translation of spoken language to printed text. The electronic translation of spoken language may permit erroneous, or at times, nonsensical words or phrases to be inadvertently transcribed; Although I have reviewed the note for such errors, some may still exist.

## 2021-10-07 ENCOUNTER — OFFICE VISIT (OUTPATIENT)
Dept: ORTHOPEDIC SURGERY | Facility: CLINIC | Age: 76
End: 2021-10-07

## 2021-10-07 VITALS
SYSTOLIC BLOOD PRESSURE: 128 MMHG | DIASTOLIC BLOOD PRESSURE: 60 MMHG | WEIGHT: 151.01 LBS | HEIGHT: 65 IN | HEART RATE: 104 BPM | BODY MASS INDEX: 25.16 KG/M2

## 2021-10-07 DIAGNOSIS — S52.571D OTHER CLOSED INTRA-ARTICULAR FRACTURE OF DISTAL END OF RIGHT RADIUS WITH ROUTINE HEALING, SUBSEQUENT ENCOUNTER: Primary | ICD-10-CM

## 2021-10-07 PROCEDURE — 99213 OFFICE O/P EST LOW 20 MIN: CPT | Performed by: ORTHOPAEDIC SURGERY

## 2021-10-07 NOTE — PROGRESS NOTES
"    [facility] Note        Subjective     CC: Follow-up (2 week f/u closed intra-articular fracture of distal end of right radius, cast removal)      HPI    Jose Robin is a 76 y.o. female. Patient is here today for follow-up of her right distal radius fracture. She comes out of her short arm cast today. Still having some pain along the radial styloid.    Overall, patient's symptoms are as above. She is here with her  today.    ROS:    Constiutional:Pt denies fever, chills, nausea, or vomiting.  MSK:as above        Objective      Past Medical History  Past Medical History:   Diagnosis Date   • Arthritis    • Breast injury     bruised left breast from a fall.   • Cancer (HCC)     skin    • Fibromyalgia    • Hyperlipidemia    • Hypertension          Physical Exam  /60   Pulse 104   Ht 165.1 cm (65\")   Wt 68.5 kg (151 lb 0.2 oz)   BMI 25.13 kg/m²     Body mass index is 25.13 kg/m².    Patient is well nourished and well developed.        Ortho Exam  Peripheral Vascular   Right Upper Extremity    No cyanotic nail beds    Pink nail beds and rapid capillary refill   Palpation    Radial Pulse - Bilaterally normal    Musculoskeletal   Right Upper Extremity   Radius:    Inspection and Palpation:    Tenderness - Moderately tender and about the wrist    Swelling - present    Effusion - none    Muscle tone - no atrophy    Pulses - +2   Deformities/Malalignments/Discrepancies    Normal bony contour    There is a documented closed fracture : location - right - distal end             Imaging/Labs/EMG Reviewed:  Imaging Results (Last 24 Hours)     Procedure Component Value Units Date/Time    XR Wrist 3+ View Right [[id number]] Resulted: 10/07/21 1730     Updated: 10/07/21 1732    Narrative:      Right Wrist X-Ray    Indication: Pain    Views:  AP, Lateral, and Oblique     Comparison: Right wrist 9/23/2021    Findings:  Fracture of the right distal radius is healing appropriately.  There " has   been no significant change in the alignment of the fracture.  No bony lesion  Normal soft tissues  Severe basilar thumb arthritis    Impression:   Healing right distal radius fracture with stable alignment compared to the   prior study.  Severe basilar thumb arthritis            Assessment    Assessment:  1. Other closed intra-articular fracture of distal end of right radius with routine healing, subsequent encounter        Plan:  Recommend over the counter anti-inflammatories for pain and/or swelling  Healing right distal radius fracture--patient will go into a removable brace today. She will work on group of 5 exercises. I will see her back in 3 weeks with 3 views of her wrist out of the splint. If she continues to heal appropriately and she still has significant stiffness, formal occupational hand therapy will be prescribed. If she seems to be headed in the right path, we will continue to advance her activity and motion on her own.      Luís Love MD  10/07/21  18:27 RYANT      Dragon disclaimer:  Much of this encounter note is an electronic transcription/translation of spoken language to printed text. The electronic translation of spoken language may permit erroneous, or at times, nonsensical words or phrases to be inadvertently transcribed; Although I have reviewed the note for such errors, some may still exist.

## 2021-10-28 ENCOUNTER — OFFICE VISIT (OUTPATIENT)
Dept: ORTHOPEDIC SURGERY | Facility: CLINIC | Age: 76
End: 2021-10-28

## 2021-10-28 VITALS
WEIGHT: 151 LBS | DIASTOLIC BLOOD PRESSURE: 57 MMHG | HEART RATE: 97 BPM | BODY MASS INDEX: 25.16 KG/M2 | HEIGHT: 65 IN | SYSTOLIC BLOOD PRESSURE: 135 MMHG

## 2021-10-28 DIAGNOSIS — S52.571D OTHER CLOSED INTRA-ARTICULAR FRACTURE OF DISTAL END OF RIGHT RADIUS WITH ROUTINE HEALING, SUBSEQUENT ENCOUNTER: Primary | ICD-10-CM

## 2021-10-28 PROCEDURE — 99213 OFFICE O/P EST LOW 20 MIN: CPT | Performed by: ORTHOPAEDIC SURGERY

## 2021-10-28 NOTE — PROGRESS NOTES
"    [facility]        Subjective     Follow-up (3 week f/u; Other closed intra-articular fracture of distal end of right radius)       REGINALD Robin is a 76 y.o. female. They return for follow-up of their right distal radius fracture.  At this point, she has been working on range of motion on her own.  She still having stiffness especially with supination.  She has a large rotator cuff on the right side she feels like this is affected her ability to do certain things with her wrist.            Objective      Physical Exam  /57   Pulse 97   Ht 165.1 cm (65\")   Wt 68.5 kg (151 lb)   BMI 25.13 kg/m²     Body mass index is 25.13 kg/m².        Ortho Exam of right wrist       Swelling: Minimal   Tenderness: None to palpation   ROM: Diminished in the plane of palmar flexion dorsiflexion and supination.  Full pronation.        Imaging/Studies  Imaging Results (Last 24 Hours)     Procedure Component Value Units Date/Time    XR Wrist 3+ View Right [[id number]] Resulted: 10/28/21 1701     Updated: 10/28/21 1702    Narrative:      Right Wrist X-Ray    Indication: Pain    Views:  AP, Lateral, and Oblique     Comparison: Right wrist 10/7/2021    Findings:  Fracture of the distal radius compared to the prior study appears healed   radiographically.  No bony lesion  Normal soft tissues  Severe thumb CMC arthritis.  Moderate to severe STT arthritis.    Impression:   Healed right distal radius fracture  Basilar thumb arthritis          Assessment    Assessment:  1. Other closed intra-articular fracture of distal end of right radius with routine healing, subsequent encounter        Plan:  Healed right distal radius fracture--patient is having some stiffness which is not unexpected.  We will send her to physical therapy at [facility] on [address].  Follow-up in 6 weeks for final check with an x-ray and we will make sure her range of motion is functional and likely release her at that " point.          Luís Love MD  10/28/21  17:07 RYANT      Dragon disclaimer:  Much of this encounter note is an electronic transcription/translation of spoken language to printed text. The electronic translation of spoken language may permit erroneous, or at times, nonsensical words or phrases to be inadvertently transcribed; Although I have reviewed the note for such errors, some may still exist.

## 2021-12-09 ENCOUNTER — OFFICE VISIT (OUTPATIENT)
Dept: ORTHOPEDIC SURGERY | Facility: CLINIC | Age: 76
End: 2021-12-09

## 2021-12-09 DIAGNOSIS — S52.571D OTHER CLOSED INTRA-ARTICULAR FRACTURE OF DISTAL END OF RIGHT RADIUS WITH ROUTINE HEALING, SUBSEQUENT ENCOUNTER: Primary | ICD-10-CM

## 2021-12-09 PROCEDURE — 99213 OFFICE O/P EST LOW 20 MIN: CPT | Performed by: ORTHOPAEDIC SURGERY

## 2021-12-09 RX ORDER — LOSARTAN POTASSIUM 100 MG/1
TABLET ORAL
COMMUNITY
Start: 2021-11-12

## 2021-12-09 NOTE — PROGRESS NOTES
[facility] [place] [facility] Note        Subjective     CC: Follow-up (6 week f/u; Other closed intra-articular fracture of distal end of right radius)      REGINALD Robin is a 76 y.o. female.  Patient here today for follow-up of her right distal radius fracture.  She has been going to [facility] on [address].  She says overall she is feeling much better.    Overall, patient's symptoms are as above.    ROS:    Constiutional:Pt denies fever, chills, nausea, or vomiting.  MSK:as above        Objective      Past Medical History  Past Medical History:   Diagnosis Date   • Arthritis    • Breast injury     bruised left breast from a fall.   • Cancer (HCC)     skin    • Fibromyalgia    • Hyperlipidemia    • Hypertension          Physical Exam  There were no vitals taken for this visit.    There is no height or weight on file to calculate BMI.    Patient is well nourished and well developed.        Ortho Exam  Patient has full dorsiflexion supination pronation of the wrist and forearm.  She has some limitation in palmar flexion.  Nontender palpation.  EPL is intact.    Imaging/Labs/EMG Reviewed:  Imaging Results (Last 24 Hours)     Procedure Component Value Units Date/Time    XR Wrist 3+ View Right [[id number]] Resulted: 12/09/21 1523     Updated: 12/09/21 1523    Narrative:      Right Wrist X-Ray    Indication: Pain    Views:  AP, Lateral, and Oblique     Comparison: Right wrist 10/28/2021    Findings:  There is a fracture of the distal radius.     New bone is visualized.   Normal soft tissues  Normal joint spaces  Alignment appears acceptable.      Impression:  Fracture of the distal radius with complete healing.            Assessment    Assessment:  1. Other closed intra-articular fracture of distal end of right radius with routine healing, subsequent encounter        Plan:  Recommend over the counter anti-inflammatories for pain and/or swelling  Right distal radius fracture--fracture  appears to be healed radiographically.  She needs to focus on her palmar flexion.  I think it safe for her to be released from formal PT and work on her exercises at home.  Follow-up as needed with me      Luís Love MD  12/09/21  15:26 EST      Dictated Utilizing Dragon Dictation.

## 2022-05-09 ENCOUNTER — TRANSCRIBE ORDERS (OUTPATIENT)
Dept: ADMINISTRATIVE | Facility: HOSPITAL | Age: 77
End: 2022-05-09

## 2022-05-09 DIAGNOSIS — Z12.31 VISIT FOR SCREENING MAMMOGRAM: Primary | ICD-10-CM

## 2022-06-27 ENCOUNTER — APPOINTMENT (OUTPATIENT)
Dept: MAMMOGRAPHY | Facility: HOSPITAL | Age: 77
End: 2022-06-27

## 2022-07-01 ENCOUNTER — HOSPITAL ENCOUNTER (OUTPATIENT)
Dept: MAMMOGRAPHY | Facility: HOSPITAL | Age: 77
Discharge: HOME | End: 2022-07-01
Admitting: INTERNAL MEDICINE

## 2022-07-01 DIAGNOSIS — Z12.31 VISIT FOR SCREENING MAMMOGRAM: ICD-10-CM

## 2022-07-01 PROCEDURE — 77067 SCR MAMMO BI INCL CAD: CPT

## 2022-07-01 PROCEDURE — 77063 BREAST TOMOSYNTHESIS BI: CPT | Performed by: RADIOLOGY

## 2022-07-01 PROCEDURE — 77067 SCR MAMMO BI INCL CAD: CPT | Performed by: RADIOLOGY

## 2022-07-01 PROCEDURE — 77063 BREAST TOMOSYNTHESIS BI: CPT

## 2023-05-31 ENCOUNTER — TRANSCRIBE ORDERS (OUTPATIENT)
Dept: ADMINISTRATIVE | Facility: HOSPITAL | Age: 78
End: 2023-05-31

## 2023-05-31 DIAGNOSIS — Z12.31 VISIT FOR SCREENING MAMMOGRAM: Primary | ICD-10-CM

## 2023-08-09 ENCOUNTER — HOSPITAL ENCOUNTER (OUTPATIENT)
Dept: GENERAL RADIOLOGY | Facility: HOSPITAL | Age: 78
Discharge: HOME | End: 2023-08-09
Admitting: INTERNAL MEDICINE
Payer: MEDICARE

## 2023-08-09 ENCOUNTER — TRANSCRIBE ORDERS (OUTPATIENT)
Dept: GENERAL RADIOLOGY | Facility: HOSPITAL | Age: 78
End: 2023-08-09
Payer: MEDICARE

## 2023-08-09 DIAGNOSIS — R07.81 RIB PAIN: ICD-10-CM

## 2023-08-09 DIAGNOSIS — R07.81 RIB PAIN: Primary | ICD-10-CM

## 2023-08-09 PROCEDURE — 71046 X-RAY EXAM CHEST 2 VIEWS: CPT

## 2023-08-09 PROCEDURE — 71100 X-RAY EXAM RIBS UNI 2 VIEWS: CPT

## 2024-03-04 ENCOUNTER — TRANSCRIBE ORDERS (OUTPATIENT)
Dept: ADMINISTRATIVE | Facility: HOSPITAL | Age: 79
End: 2024-03-04
Payer: MEDICARE

## 2024-03-04 DIAGNOSIS — M70.62 TROCHANTERIC BURSITIS OF LEFT HIP: Primary | ICD-10-CM

## 2024-05-03 ENCOUNTER — HOSPITAL ENCOUNTER (OUTPATIENT)
Dept: MRI IMAGING | Facility: HOSPITAL | Age: 79
Discharge: HOME | End: 2024-05-03
Payer: MEDICARE

## 2024-05-03 DIAGNOSIS — M70.62 TROCHANTERIC BURSITIS OF LEFT HIP: ICD-10-CM

## 2024-05-03 PROCEDURE — 72195 MRI PELVIS W/O DYE: CPT

## 2024-05-03 PROCEDURE — 73721 MRI JNT OF LWR EXTRE W/O DYE: CPT

## 2024-06-03 ENCOUNTER — TELEPHONE (OUTPATIENT)
Dept: GASTROENTEROLOGY | Facility: CLINIC | Age: 79
End: 2024-06-03

## 2024-06-03 NOTE — TELEPHONE ENCOUNTER
Caller: Jose Robin    Relationship to patient: Self    Best call back number: [phone]    Chief complaint: RECALL 1/1/24    Type of visit: CLS    Requested date: PLEASE CALL TO SCHEDULE     If rescheduling, when is the original appointment:      Additional notes:

## 2024-07-02 RX ORDER — SODIUM, POTASSIUM,MAG SULFATES 17.5-3.13G
1 SOLUTION, RECONSTITUTED, ORAL ORAL TAKE AS DIRECTED
Qty: 354 ML | Refills: 0 | Status: SHIPPED | OUTPATIENT
Start: 2024-07-02

## 2024-07-09 ENCOUNTER — OUTSIDE FACILITY SERVICE (OUTPATIENT)
Dept: GASTROENTEROLOGY | Facility: CLINIC | Age: 79
End: 2024-07-09
Payer: MEDICARE

## 2024-07-11 ENCOUNTER — TRANSCRIBE ORDERS (OUTPATIENT)
Dept: ADMINISTRATIVE | Facility: HOSPITAL | Age: 79
End: 2024-07-11
Payer: MEDICARE

## 2024-07-11 DIAGNOSIS — Z12.31 SCREENING MAMMOGRAM FOR BREAST CANCER: Primary | ICD-10-CM

## 2024-07-17 ENCOUNTER — HOSPITAL ENCOUNTER (OUTPATIENT)
Dept: MAMMOGRAPHY | Facility: HOSPITAL | Age: 79
Discharge: HOME | End: 2024-07-17
Admitting: INTERNAL MEDICINE
Payer: MEDICARE

## 2024-07-17 DIAGNOSIS — Z12.31 SCREENING MAMMOGRAM FOR BREAST CANCER: ICD-10-CM

## 2024-07-17 PROCEDURE — 77067 SCR MAMMO BI INCL CAD: CPT

## 2024-07-17 PROCEDURE — 77063 BREAST TOMOSYNTHESIS BI: CPT

## 2025-06-09 ENCOUNTER — TRANSCRIBE ORDERS (OUTPATIENT)
Dept: ADMINISTRATIVE | Facility: HOSPITAL | Age: 80
End: 2025-06-09
Payer: MEDICARE

## 2025-06-09 DIAGNOSIS — Z12.31 VISIT FOR SCREENING MAMMOGRAM: Primary | ICD-10-CM

## 2025-07-30 ENCOUNTER — HOSPITAL ENCOUNTER (OUTPATIENT)
Dept: MAMMOGRAPHY | Facility: HOSPITAL | Age: 80
Discharge: HOME | End: 2025-07-30
Admitting: INTERNAL MEDICINE
Payer: MEDICARE

## 2025-07-30 DIAGNOSIS — Z12.31 VISIT FOR SCREENING MAMMOGRAM: ICD-10-CM

## 2025-07-30 PROCEDURE — 77067 SCR MAMMO BI INCL CAD: CPT

## 2025-07-30 PROCEDURE — 77063 BREAST TOMOSYNTHESIS BI: CPT
